# Patient Record
Sex: MALE | Race: WHITE | NOT HISPANIC OR LATINO | Employment: UNEMPLOYED | URBAN - METROPOLITAN AREA
[De-identification: names, ages, dates, MRNs, and addresses within clinical notes are randomized per-mention and may not be internally consistent; named-entity substitution may affect disease eponyms.]

---

## 2019-04-08 ENCOUNTER — TRANSFERRED RECORDS (OUTPATIENT)
Dept: HEALTH INFORMATION MANAGEMENT | Facility: CLINIC | Age: 29
End: 2019-04-08

## 2019-08-14 ENCOUNTER — TRANSFERRED RECORDS (OUTPATIENT)
Dept: HEALTH INFORMATION MANAGEMENT | Facility: CLINIC | Age: 29
End: 2019-08-14

## 2020-03-30 ENCOUNTER — TRANSFERRED RECORDS (OUTPATIENT)
Dept: HEALTH INFORMATION MANAGEMENT | Facility: CLINIC | Age: 30
End: 2020-03-30

## 2020-06-16 ENCOUNTER — TRANSCRIBE ORDERS (OUTPATIENT)
Dept: OTHER | Age: 30
End: 2020-06-16

## 2020-06-16 DIAGNOSIS — M62.838 NECK MUSCLE SPASM: Primary | ICD-10-CM

## 2020-06-17 PROBLEM — M62.838 MUSCLE SPASM: Status: ACTIVE | Noted: 2020-06-17

## 2020-06-17 PROBLEM — G25.9 FUNCTIONAL MOVEMENT DISORDER: Status: ACTIVE | Noted: 2020-06-17

## 2020-06-17 PROBLEM — S06.9X0S: Status: ACTIVE | Noted: 2019-10-09

## 2020-06-17 PROBLEM — G47.411 CATAPLEXY: Status: ACTIVE | Noted: 2020-06-17

## 2020-06-17 RX ORDER — SERTRALINE HYDROCHLORIDE 25 MG/1
25 TABLET, FILM COATED ORAL DAILY
COMMUNITY
Start: 2019-10-09

## 2020-06-17 RX ORDER — PSYLLIUM HUSK 0.4 G
2000 CAPSULE ORAL
COMMUNITY

## 2020-06-17 RX ORDER — ERGOCALCIFEROL 1.25 MG/1
50000 CAPSULE, LIQUID FILLED ORAL WEEKLY
COMMUNITY

## 2020-06-17 RX ORDER — TESTOSTERONE 1.62 MG/G
1 GEL TRANSDERMAL DAILY
COMMUNITY

## 2020-06-17 RX ORDER — CHLORAL HYDRATE 500 MG
1000 CAPSULE ORAL DAILY
COMMUNITY

## 2020-06-17 NOTE — PROGRESS NOTES
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 RRR.   Lungs: CTA  Abdomen: Bowel sounds normal. Soft. Nontender.   Extremities: No edema.   Psych: Normal affect. Smiled during encounter. No suggestion of depression or anxiety.  Neuro: Alert and oriented. Follows commands. Good historian. Speech unremarkable. Cranial nerves grossly intact. Strength intact in ue and le. Reflexes 2+ and symmetric in ue and le. No clonus. Toes downgoing. Sensation intact to temperature and vibration. Finger-nose normal.     Relevant work-up:  --Myasthenia lab panel negative x2 ([facility]), anti-MUSK negative, anaplasmosis antibodies negative, TSH normal x3, paraneoplastic panel negative, CRP and ESR normal, urine heavy metals negative, Sjogren's antibodies negative, ANNABELLE negative, double-stranded DNA negative, SPEP negative, UPEP negative, CPK negative x3.  --2012 Spirometry: Normal spirometry. Significant bronchodilator response observed.   --10/27/14 TTE: Normal echocardiogram.   --10/31/13 MRI brain w/out " "and w/ contrast: Negative MRI of the brain.  --11/25/13 Ultrasound guided left axillary lymph node biopsy: Fragments of benign lymph node.  --11/21/13 CT of the chest w/out contrast (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 2. The lungs are clear and numerous axillary lymph nodes are present which are of uncertain significance.   --11/12/14 CT chest w/out contrast from [facility] (f/u for above study): 1. No significant intrathoracic abnormality identified. Stable residual thymic tissue, a normal finding. 2. Fatty infiltration of the liver, increased in severity compared to the previous exam.  --11/27/13 EMG: This is a normal electrophysiologic study. There was no evidence of neuropathy, myopathy, or neuromuscular junction disease. Repetitive nerve stimulation test on the left median and right nasalis muscle was normal.   --1/3/14 EMG (at [facility]): Routine nerve conduction studies, repetitive stimulation, single fiber analysis, and standard EMG was normal. \"This is a normal study. There is no electrophysiologic evidence of a defect of neuromuscular transmission.\"  --5/27/14 Tilt Table Test at [facility]: \"Tilt was performed to 70 degrees resulting in increase in HR from 65 to 108 with no change in blood pressure. Within 4-5 minutes of tilting, he had multiple episodes including stereotyped movements of rightward gaze and moving his head to the right with persistent muscle contraction. Each episode lasted approximately 5 seconds followed by relaxation to normal. The patient was aware that he had an episode but he was unable to respond to verbal stimuli during the episode. There was no period of confusion, slurred speech, or abnormal muscle movements after the episode was complete. IMPRESSION: Postural orthostatic tachycardia syndrome. Patient will start pyridostigmine 180 mg SA.   --4/1/15 Repeat Tilt Table Test at [facility]: No criteria to confirm the presence of posterior orthostatic tachycardia syndrome (POTS) or a neurocardiogenic response. However, patient's symptoms were reproducible.   --4/6/15 Sleep Study at [facility]: 1. Normal respiratory pattern without evidence of sleep apnea (AHI 2.4/hour, SPO2 luis 90%). 2. Initiation insomnia, otherwise normal sleep architecture.    ASSESSMENT AND PLAN:   25 year old male diagnosed with Postural Orthostatic Tachycardia Syndrome with a title table study at the [facility] in May 2014. He reports substantial symptomatic relief for his stereotyped spells/symptoms with mestinon and florinef, both of which are thought to be useful for POTS. POTS can reportedly be triggered by exertion, standing, and low fluids; it can cause dizziness, lightheadedness, syncope, exercise intolerance, blurred vision, weakness, palpitations, tremulousness, anxiety, nausea, abdominal cramps, early satiety, bloating, constipation, and diarrhea. Thus, his blurred vision, fatigue, weakness, and potentially loss of tone (presyncope and syncope?) as triggered by standing, exertion, and poor fluid intake can be explained by POTS. However, I lack an organic explanation for the stereotyped contractions of his left lower face and left platysma, the left eye adduction and convergence spasms, the stiffening of his extremities, the loss of pain/temperature sensation, the inability to speak, and now the reported inability to breathe for 5-10 seconds, all of which are occurring during his spells. I'm not aware of those particular symptoms being related to POTS. I am the sixth neurologist the patient has been seen by for these same spells/symptoms since their onset in October of 2013: Dr. Mcclellan and Dr. Mc of [facility], Dr. Mckeon of [facility], Dr. Goncalves of the [facility], and [facility] all [facility] previously. Dr. Mcclellan, Dr. Mc, and Dr. Goncalves felt his spells/symptoms were  "functional. Dr. Vargas of the [facility] stated, \"When I evaluated the patient, my impression was that the bulk of his symptoms has no clear organic basis.\" Dr. Mckeon initiated a work-up but didn't see him again per patient (thus no conclusion). At the [facility] the patient saw ophthalmology, neurology, and behavioral health and was labeled as having undifferentiated somatoform disorder, undifferentiated spells, and convergence spasms; their notes from April 2014 indicated conversion disorder was suspected. Like others, I lack an neurological/organic explanation for his presentation and I am not at all confident he even has POTS as [facility] diagnosed [facility] the [facility] (I think the clinical correlation is poor). My lingering concern is that his entire presentation is functional, likely related to a conversion disorder. Plan as follows:    --Continue current dose of pyridostigmine  mg daily for POTS. He thinks it helps.  --Continue fludrocortisone to 0.2 mg daily for POTS. He thinks it helps.  --Agree with recommendations for hydration, salt intake, and compression stockings from cardiology  --Referral to the [facility] for second opinion on the diagnosis of [facility] from the [facility]. I don't feel that is an accurate diagnosis, which I explained to the patient today.  --MRI brain and orbits w/out and with contrast  --Referral to pulmonary medicine to see if they have an organic explanation for his new concerns of breathing disturbance during his spells  --Follow-up with sleep medicine as previously arranged for his reports of hypersomnia  --Follow-up to be determined. I strongly suspect his entire presentation is functional. He has followed here because he was labeled as having P[facility] the [facility], a diagnosis I feel is inaccurate. I am referring him for a second opinion on that at the [facility], with follow-up to be arranged afterwards.     Braulio Adame MD  Neurology Department    Electronically signed by Braulio Adame " MD KHAI at 2016 7:51 AM CDT        Kaylee Mcclellan MD - 2014 3:04 PM [facility]    Patient Name: GUILHERME DOMÍNGUEZ  Date of Service: 2014 : 1990 Age: 23Y Sex: M  DC MRN: [id number] Site MRN:   Patient Loc/Room #: ND THERON/  Provider: Kaylee Mcclellan MD, Neurology    OFFICE NOTE    SITE: [facility]    REASON FOR VISIT: Patient is a 23-year-old man who is in our clinic today for followup regarding neuromuscular symptoms.     HISTORY OF PRESENT ILLNESS: He was last seen in our office in 2013 for symptoms of diplopia, ptosis, and dysphagia. He was referred to [facility]. He was seen at [facility]. 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 They also thought he had fatigue of nonorganic origin. So patient comes in today. He is having new symptoms, including episodes of apnea that happened a few days ago where he stopped breathing for about 10 seconds. He was awake. He said he could not close his eyes, he could not move and he could not breathe for about 10 seconds. Patient has been off prednisone for about a month and his symptoms of ptosis and diplopia have worsened since that time. He reports fatigue that has been worse, shortness of breath. Patient comes in today requesting a referral to an immunologist.     MEDICATIONS: Currently, patient is on:  - Mestinon 180 mg 2 times a day.  - Albuterol.     REVIEW OF SYSTEMS: Please see form in Epic.     PHYSICAL EXAM: Vital signs - blood pressure 119/78, pulse 84, respirations 16. On general exam, patient seems in some distress and had difficulty breathing at times. This would fluctuate during the exam. Sometimes he is breathing fine; sometimes he seems like in labored breathing. He has weird eye movements with some fluctuating ptosis. He is able to move all extremities equally. His gait has needed assist to walk.     IMPRESSION AND PLAN: This is a 23-year-old male with multiple complaints, including  ptosis, diplopia, shortness of breath, fatigue. He was evaluated at [facility] and he was thought to have convergent spasm and fatigue of nonorganic origin. At this point, I am thinking there is some underlying anxiety or psychogenic cause for his symptoms. I told the patient that I would recommend that he see a psychiatrist or a psychologist to help him, at least with other psych symptoms that could be happening at the same time. Patient verbalized that [facility]. For his request to see an immunologist, I told him to call [facility] back and tell them that he is having more symptoms to see if they will recommend that he sees an immunologist there. Patient will follow up in our clinic as needed.     Kaylee Mcclellan MD  [facility]  Neurology    cc:    D: 02/18/2014 17:09:08/[facility] Job ID: [id number]  T: 02/19/2014 15:04:13/jll Document ID: [id number]        Electronically signed by Kaylee Mcclellan MD at 02/21/2014 8:45 PM CST    Back to top of Progress Notes  Kaylee Mcclellan MD - 02/18/2014 5:09 PM CST  This note has been dictated.      Electronically signed by Kaylee Mcclellan MD at 02/18/2014 5:09 PM CST          Alon Garcia MD - 11/29/2013 7:22 PM CST  Formatting of this note might be different from the original.  11/29/2013 HOSPITALIST DISCHARGE SUMMARY Alon Garcia MD    Patient Name: Cruz Bryson  YOB: 1990 Age: 23 year old  Medical Record Number: [id number]  Primary Physician: Zeenat Zafar MD Phone: [phone]  Admission Date: 11/24/2013   Discharge Date: 11/29/2013    He will be discharged from [facility]  to home.    Admission Diagnoses: Neuromuscular disorder [358.9]  Weakness [780.79]  Myasthenia [358.00]    Discharge Diagnoses:   Active Problems:  Neuromuscular disease  Myasthenia    Procedures:   Ultrasound Guided Needle Biopsy of Left Axillary Lymph Node 11/25    Electromyelogram  "(11/27):  \"FINDINGS: The left peroneal, tibial, median, and ulnar motor responses including F-wave responses were normal.   The left sural median and ulnar sensory responses were normal.   Needle EMG of selected muscles of the left upper and lower extremity was normal.   Repetitive nerve stimulation test on the left median and right nasalis muscle was normal.   IMPRESSION: This is a normal electrophysiologic study. There was no evidence of neuropathy, myopathy, or neuromuscular junction disease. \"    Complications: none    Imaging/Studies:  CT-Chest w/o contrast (11/21 at [facility])   \"There is a soft tissue attenuation mass in the anterior mediastinum with a somewhat quadrilateral configuration. This measures 2.3 cm in width. The anterior-posterior diameter is 2.0 cm and it has some mild heterogeneity, possibly some linear fat attenuation.   No additional lymphadenopathy within the mediastinum or aries.   There [are] numerous axillary lymph nodes present bilaterally which have the appearance of benign appearing fatty aries.   The largest axillary lymph node on the left is 2.0 cm in the long axis.   No masses or adenopathy in the upper abdomen.   The lung parenchyma appears normal. No consolidation or pulmonary nodularity. No pleural effusions. Normal heart size with unremarkable appearance of the pericardium.   Impression:   1. 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 The patient has not apparently had surgery as there are no median sternotomy wires identified.   2. The lungs are clear and numerous axillary lymph nodes are present which are of uncertain significance.\"    Discharge Medications:  Current Discharge Medication List     START taking these medications   Details   pyridostigmine CR (MESTINON TIMESPAN) 180 MG tablet Take 1 Tab by mouth two times a day. Do not crush; separate doses by at least 6 hours.  Qty: 60 Tab, Refills: 1     !! predniSONE " (DELTASONE) 20 MG tablet Take one tab by mouth daily for 15 days. Then take 2 tabs by mouth daily. Take with food.  Qty: 45 Tab, Refills: 2     !! predniSONE (DELTASONE) 5 MG tablet Take 1 tab by mouth daily for 5 days. Then take 2 tabs daily for 5 days. Then take 3 tabs daily for 5 days. Take with food.  Qty: 30 Tab, Refills: 0     !! - Potential duplicate medications found. Please discuss with provider.     CONTINUE these medications which have NOT CHANGED   Details   albuterol HFA (PROVENTIL HFA) 108 (90 BASE) MCG/ACT inhaler Inhale 2 Puffs into the lungs every six hours as needed for Shortness of Breath. Shake before using.  Qty: 1 Inhaler       STOP taking these medications     pyridostigmine (MESTINON) 60 MG TABS Comments:   Reason for Stopping:         Follow Up Instructions:  Appointment with Dr. Mcclellan on 12/20/13  Follow up with PCP Dr. Zafar in 3 weeks.    Discharge Procedure Orders  NEW Diet/NPO Status   Order Specific Question Answer Comments   Diet type General     Activity as tolerated     Discharge call MD   Order Comments: Call MD for: temperature > 100.4     Discharge call MD   Order Comments: Call MD for: Persistent nausea and vomiting.     Discharge call MD   Order Comments: Call MD for: severe uncontrolled pain.     Discharge call MD   Order Comments: Call MD for: Difficulty breathing, headache or visual disturbances.     Discharge call MD   Order Comments: Call MD for: Persistent dizziness or lightheadedness.     Discharge call MD   Order Comments: Call MD for: extreme fatigue     Brief Hospital Course: Please see Dr. Jose's H&P from 11/24/13 for complete history. Briefly, Cruz Bryson is a previously healthy 23 year old man with one month history of progressively worsening neuromuscular symptoms. These began with blurred vision and diplopia, and progressed to weakness with ambulation and even weakness of his respiratory muscles. Symptoms always worsened as the day went on and were  improved by morning. He had been previously had a normal brain MRI through his PCP, and had been seen by [facility]. Mckeon at [facility] for outpatient neurologic evaluation. Initial serologic evaluation for myasthenia gravis was negative. He had been started on pyridostigmine 30mg every 4 hours with some improvement of symptoms. A CT-Chest had showed some axillary lymphadenopathy and possible enlargement of the thymus.     He came to the emergency room after his respiratory weakness became severe enough that his girlfriend was afraid he would suffocate in his sleep. In the ER he was noted to have marked dysconjugate external ocular movements, and initially normal strength that became weaker when asked to maintain position against resistance. The remainder of his physical and neurological exam was normal. A CBC and BMP were normal except for Creatinine of 1.21. He was admitted for observation and further workup. Dr. De La Fuente with neurology was consulted. His symptoms improved with increase of his pyridostigmine dose to 60mg; he initially had some side-effects of bradycardia and fasciculations, but these resolved after a few days. Additional serological studies sent to [place]. Biopsy of one of the axillary lymph nodes showed no abnormalities. An EMG also was unrevealing. The patient was started on IV steroids and received 4 doses of IVIG for bridging, for a total of 140g IVIG. By hospital day #6 his symptoms were greatly improved with only minor blurred vision whenever his pyridostigmine began to wear off, and no further respiratory symptoms. He felt well and was ready for discharge. Further workup was planned as an outpatient with Dr. Mcclellan with send-out serologies still pending.     Discharge Exam:  /70  Pulse 63  Temp(Src) 37  C (98.6  F) (Oral)  Resp 18  Ht 1.829 m (6')  Wt 99.519 kg (219 lb 6.4 oz)  BMI 29.75 kg/m2  SpO2 96%  General: Up in chair, pleasant, in no acute discomfort.   HEENT: EOMI; able to  "maintain upward gaze ~20 seconds, then develops dysconjugate gaze. No nystagmus, no conjunctival pallor or icterus. Normal moist OP mucosa.   Neck: Supple, no meningismus.   CV: RRR, no murmurs or gallops   Resp: Good air movement symmetric B/L with no wheezes and no crackles.   Abd: Soft, nontender/nondistended, no guarding or rebound, with normal bowel sounds.   Ext: Normal peripheral pulses, no edema, brisk cap refill   Skin: No rashes, pallor, or icterus   Neuro: CN II-XII grossly intact with exception of EOM abnormalities described above.     Recent Labs:  No results found for this basename: WBC, HGB, PLTS, INR, in the last 72 hours  Recent Labs   11/28/13  0929   *   K 3.7      BUN 17   CREAT 1.07   GLUC 98   CA 9.2     Left Axillary Lymph Node Path Specimen:  \"MACROSCOPIC  Received is one specimen container, labeled with proper patient identification. Specimen is designated \"left axilla\". The specimen is received fresh, for the cytologic evaluation of a needle core biopsy and consists of eight tan soft needle core biopsies and fragments, ranging from 0.1-0.5 cm in length. The specimen is entirely submitted in a single cassette.  Adequacy: Adequate specimen obtained. Sample submitted for flow cytometry, per Dr. Liban Gamino. [facility]    MICROSCOPIC  Sections show fragments of a benign lymph node displaying no atypical  infiltrates or specific diagnostic abnormalities. [facility]    DIAGNOSIS  Lymph node, left axilla, core biopsies: Fragments of benign lymph node.\"    In Process Labs (336h ago through future)   Start Ordered   11/25/13 1645 Paraneoplastc AB ONE TIME, Routine   11/25/13 1645   11/25/13 1044 Sendout Handling ONE TIME   11/25/13 1044   11/25/13 1030 Musk Antibody ONE TIME, Routine   11/25/13 1022       Alon Garcia MD  Discussed with resident team and attending physician.  Total time spent for discharge on date of discharge: >30 minutes.        Electronically signed by Britta Bryson MD " at 11/30/2013 3:50 AM CST

## 2020-06-17 NOTE — TELEPHONE ENCOUNTER
"  RECORDS RECEIVED FROM: External   Date of Appt: 6/19/20   NOTES (FOR ALL VISITS) STATUS DETAILS   OFFICE NOTE from referring provider Received Dr Sadler @ [facility]:  4/17/19  1/10/19   OFFICE NOTE from other specialist N/A Dr Shawn Vargas @ [facility]:  3/11/19  8/31/18    Dr Gopi Adair @ [facility]:  3/30/20   DISCHARGE SUMMARY from hospital N/A    DISCHARGE REPORT from the ER N/A    OPERATIVE REPORT N/A    MEDICATION LIST Received    IMAGING  (FOR ALL VISITS)     EMG N/A    EEG N/A    MRI (HEAD, NECK, SPINE) Received [facility]:  MRI Brain 9/3/14   LUMBAR PUNCTURE Received [facility]  4/8/19   LEATHA Scan N/A    CT (HEAD, NECK, SPINE) N/A       Action 6/17/20 MV 11.47am   Action Taken Received only consult note and referral from [facility]. Faxed to scanning.    Request faxed to [facility] for additional \"extensive work up\" mentioned in referral. (Includes EEG, MRIs, LP)     Action 6/18/20 MV 7.30am   Action Taken Records received from [facility]. Sent to scanning. Images and imaging reports will be mailed.     NOTE: EMG, EEG tests were done in 2013/2014. This is outside of the 2 year danyel for records collection so they were not requested.     Action 6/18/20 MV 7.36am   Action Taken Lumbar puncture results requested from [facility]     Action 6/18/20 MV 10.25am   Action Taken Lumbar puncture results received via fax. Sent to urgent scanning      Imaging Received  6/19/20 MV 7.03am  [facility]   Image Type (x): Disc: x  PACS:    Exam Date/Name MRI Brain 9/3/14 Comments: imaging received via mail. Images uploaded to PACS                      "

## 2020-06-19 ENCOUNTER — VIRTUAL VISIT (OUTPATIENT)
Dept: NEUROLOGY | Facility: CLINIC | Age: 30
End: 2020-06-19
Attending: STUDENT IN AN ORGANIZED HEALTH CARE EDUCATION/TRAINING PROGRAM
Payer: COMMERCIAL

## 2020-06-19 ENCOUNTER — PRE VISIT (OUTPATIENT)
Dept: NEUROLOGY | Facility: CLINIC | Age: 30
End: 2020-06-19

## 2020-06-19 DIAGNOSIS — M62.838 MUSCLE SPASM: ICD-10-CM

## 2020-06-19 DIAGNOSIS — G25.9 FUNCTIONAL MOVEMENT DISORDER: ICD-10-CM

## 2020-06-19 RX ORDER — PYRIDOSTIGMINE BROMIDE 180 MG/1
180 TABLET, EXTENDED RELEASE ORAL 2 TIMES DAILY
COMMUNITY

## 2020-06-19 RX ORDER — FLUDROCORTISONE ACETATE 0.1 MG/1
0.2 TABLET ORAL DAILY
COMMUNITY

## 2020-06-19 ASSESSMENT — PAIN SCALES - GENERAL: PAINLEVEL: NO PAIN (0)

## 2020-06-19 NOTE — PROGRESS NOTES
"Cruz Bryson is a 29 year old male who is being evaluated via a billable video visit.      The patient has been notified of following:     \"This video visit will be conducted via a call between you and your physician/provider. We have found that certain health care needs can be provided without the need for an in-person physical exam.  This service lets us provide the care you need with a video conversation.  If a prescription is necessary we can send it directly to your pharmacy.  If lab work is needed we can place an order for that and you can then stop by our lab to have the test done at a later time.    Video visits are billed at different rates depending on your insurance coverage.  Please reach out to your insurance provider with any questions.    If during the course of the call the physician/provider feels a video visit is not appropriate, you will not be charged for this service.\"    Patient has given verbal consent for Video visit? YES  PLEASE SEND LINK TO   [email or web address]    Will anyone else be joining your video visit? No        Video-Visit Details    Type of service:  Video Visit    Video Start Time:   Video End Time:     Originating Location (pt. Location): Home    Distant Location (provider location):  [facility] NEUROLOGY     Platform used for Video Visit: Other: power [employer] EMT  "

## 2020-06-19 NOTE — PATIENT INSTRUCTIONS
Discussed diagnosis  [email or web address]    Consider visit at [facility]   Contact: Nimisha Miller MD [phone] [email or web address]   Contact: Kathya Bonilla MD [phone] [email or web address]     Consider visit with Rafael Narvaez at [facility], [address], [place], [place], [address]    Consider visit with Dr Meera Brown at [facility]  [email or web address]    Be[facility] Program in patient rehabilitation at [facility]    Neuromodulatory research  TMS - transcranial magnetic stimulation  tdCS - transcranial direct current stimulation     Physical Therapy with Radha Yates at [place]

## 2020-06-19 NOTE — LETTER
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 that.    ROS: Reviewed. As per epic.     OBJECTIVE:  Blood pressure 129/81, pulse 82, resp. rate (!) 98.  General: Young  male. Cooperative. No distress.   Skin: No rashes/lesions noted in exposed areas.  HEENT: Atraumatic. Normocephalic. PERRL.   Neck: No thyromegaly.  Heart: RRR.   Lungs: CTA  Abdomen: Bowel sounds normal. Soft. Nontender.   Extremities: No edema.   Psych: Normal affect. Smiled during encounter. No suggestion of depression or anxiety.  Neuro: Alert and oriented. Follows commands. Good historian. Speech unremarkable. Cranial nerves grossly intact. Strength intact in ue and le. Reflexes 2+ and symmetric in ue and le. No clonus. Toes downgoing. Sensation intact to temperature and vibration. Finger-nose normal.     Relevant work-up:  --Myasthenia lab panel negative x2 ( and [place]), anti-MUSK negative, anaplasmosis antibodies negative, TSH normal x3, paraneoplastic panel negative, CRP and ESR normal, " "urine heavy metals negative, Sjogren's antibodies negative, ANNABELLE negative, double-stranded DNA negative, SPEP negative, UPEP negative, CPK negative x3.  --2012 Spirometry: Normal spirometry. Significant bronchodilator response observed.   --10/27/14 TTE: Normal echocardiogram.   --10/31/13 MRI brain w/out and w/ contrast: Negative MRI of the brain.  --11/25/13 Ultrasound guided left axillary lymph node biopsy: Fragments of benign lymph node.  --11/21/13 CT of the chest w/out contrast (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 2. The lungs are clear and numerous axillary lymph nodes are present which are of uncertain significance.   --11/12/14 CT chest w/out contrast from [facility] (f/u for above study): 1. No significant intrathoracic abnormality identified. Stable residual thymic tissue, a normal finding. 2. Fatty infiltration of the liver, increased in severity compared to the previous exam.  --11/27/13 EMG: This is a normal electrophysiologic study. There was no evidence of neuropathy, myopathy, or neuromuscular junction disease. Repetitive nerve stimulation test on the left median and right nasalis muscle was normal.   --1/3/14 EMG (at [facility]): Routine nerve conduction studies, repetitive stimulation, single fiber analysis, and standard EMG was normal. \"This is a normal study. There is no electrophysiologic evidence of a defect of neuromuscular transmission.\"  --5/27/14 Tilt Table Test at [facility]: \"Tilt was performed to 70 degrees resulting in increase in HR from 65 to 108 with no change in blood pressure. Within 4-5 minutes of tilting, he had multiple episodes including stereotyped movements of rightward gaze and moving his head to the right with persistent muscle contraction. Each episode lasted approximately 5 seconds followed by relaxation to normal. The patient was aware that he had an episode but he was unable " to respond to verbal stimuli during the episode. There was no period of confusion, slurred speech, or abnormal muscle movements after the episode was complete. IMPRESSION: Postural orthostatic tachycardia syndrome. Patient will start pyridostigmine 180 mg SA.   --4/1/15 Repeat Tilt Table Test at [facility]: No criteria to confirm the presence of posterior orthostatic tachycardia syndrome (POTS) or a neurocardiogenic response. However, patient's symptoms were reproducible.   --4/6/15 Sleep Study at [facility]: 1. Normal respiratory pattern without evidence of sleep apnea (AHI 2.4/hour, SPO2 luis 90%). 2. Initiation insomnia, otherwise normal sleep architecture.    ASSESSMENT AND PLAN:   25 year old male diagnosed with Postural Orthostatic Tachycardia Syndrome with a title table study at the [facility] in May 2014. He reports substantial symptomatic relief for his stereotyped spells/symptoms with mestinon and florinef, both of which are thought to be useful for POTS. POTS can reportedly be triggered by exertion, standing, and low fluids; it can cause dizziness, lightheadedness, syncope, exercise intolerance, blurred vision, weakness, palpitations, tremulousness, anxiety, nausea, abdominal cramps, early satiety, bloating, constipation, and diarrhea. Thus, his blurred vision, fatigue, weakness, and potentially loss of tone (presyncope and syncope?) as triggered by standing, exertion, and poor fluid intake can be explained by POTS. However, I lack an organic explanation for the stereotyped contractions of his left lower face and left platysma, the left eye adduction and convergence spasms, the stiffening of his extremities, the loss of pain/temperature sensation, the inability to speak, and now the reported inability to breathe for 5-10 seconds, all of which are occurring during his spells. I'm not aware of those particular symptoms being related to POTS. I am the sixth neurologist the patient has been  "seen by for these same spells/symptoms since their onset in October of 2013: Dr. Mcclellan and Dr. Mc of [facility], Dr. Mckeon of [facility], Dr. Goncalves of [facility], and [facility] all [facility]. Dr. Mcclellan, Dr. Mc, and Dr. Goncalves felt his spells/symptoms were functional. Dr. Vargas of the [facility] stated, \"When I evaluated the patient, my impression was that the bulk of his symptoms has no clear organic basis.\" Dr. Mckeon initiated a work-up but didn't see him again per patient (thus no conclusion). At the [facility] the patient saw ophthalmology, neurology, and behavioral health and was labeled as having undifferentiated somatoform disorder, undifferentiated spells, and convergence spasms; their notes from April 2014 indicated conversion disorder was suspected. Like others, I lack an neurological/organic explanation for his presentation and I am not at all confident he even has POTS as [facility] diagnosed [facility] the [facility] (I think the clinical correlation is poor). My lingering concern is that his entire presentation is functional, likely related to a conversion disorder. Plan as follows:    --Continue current dose of pyridostigmine  mg daily for POTS. He thinks it helps.  --Continue fludrocortisone to 0.2 mg daily for POTS. He thinks it helps.  --Agree with recommendations for hydration, salt intake, and compression stockings from cardiology  --Referral to the [facility] for second opinion on the diagnosis of [facility]. I don't feel that is an accurate diagnosis, which I explained to the patient today.  --MRI brain and orbits w/out and with contrast  --Referral to pulmonary medicine to see if they have an organic explanation for his new concerns of breathing disturbance during his spells  --Follow-up with sleep medicine as previously arranged for his reports of hypersomnia  --Follow-up to be determined. I strongly suspect his entire presentation is " functional. He has followed here because he was labeled as having [facility], a diagnosis I feel is inaccurate. I am referring him for a second opinion on that at the [facility], with follow-up to be arranged afterwards.     Braulio Adame MD  Neurology Department    Electronically signed by Braulio Adame MD at 2016 7:51 AM CDT        Kaylee Mcclellan MD - 2014 3:04 PM [facility]    Patient Name: GUILHERME DOMÍNGUEZ  Date of Service: 2014 : 1990 Age: 23Y Sex: M  DC MRN: [id number] Site MRN:   Patient Loc/Room #: DC THERON/  Provider: Kaylee Mcclellan MD, Neurology    OFFICE NOTE    SITE: [facility]    REASON FOR VISIT: Patient is a 23-year-old man who is in our clinic today for followup regarding neuromuscular symptoms.     HISTORY OF PRESENT ILLNESS: He was last seen in our office in 2013 for symptoms of diplopia, ptosis, and dysphagia. He was referred to [place]. He was seen at [place]. 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 They also thought he had fatigue of nonorganic origin. So patient comes in today. He is having new symptoms, including episodes of apnea that happened a few days ago where he stopped breathing for about 10 seconds. He was awake. He said he could not close his eyes, he could not move and he could not breathe for about 10 seconds. Patient has been off prednisone for about a month and his symptoms of ptosis and diplopia have worsened since that time. He reports fatigue that has been worse, shortness of breath. Patient comes in today requesting a referral to an immunologist.     MEDICATIONS: Currently, patient is on:  - Mestinon 180 mg 2 times a day.  - Albuterol.     REVIEW OF SYSTEMS: Please see form in Epic.     PHYSICAL EXAM: Vital signs - blood pressure 119/78, pulse 84, respirations 16. On general exam, patient seems in some distress and had difficulty breathing at times. This would fluctuate during the  exam. Sometimes he is breathing fine; sometimes he seems like in labored breathing. He has weird eye movements with some fluctuating ptosis. He is able to move all extremities equally. His gait has needed assist to walk.     IMPRESSION AND PLAN: This is a 23-year-old male with multiple complaints, including ptosis, diplopia, shortness of breath, fatigue. 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 At this point, I am thinking there is some underlying anxiety or psychogenic cause for his symptoms. I told the patient that I would recommend that he see a psychiatrist or a psychologist to help him, at least with other psych symptoms that could be happening at the same time. Patient verbalized that [facility]. For his request to see an immunologist, I told him to call [place] back and tell them that he is having more symptoms to see if they will recommend that he sees an immunologist there. Patient will follow up in our clinic as needed.     Kaylee Mcclellan MD  [facility]  Neurology    cc:    D: 02/18/2014 17:09:08/[facility] Job ID: [id number]  T: 02/19/2014 15:04:13/jll Document ID: [id number]        Electronically signed by Kaylee Mcclellan MD at 02/21/2014 8:45 PM CST    Back to top of Progress Notes  Kaylee Mcclellan MD - 02/18/2014 5:09 PM CST  This note has been dictated.      Electronically signed by Kaylee Mcclellan MD at 02/18/2014 5:09 PM CST          Alon Garcia MD - 11/29/2013 7:22 PM CST  Formatting of this note might be different from the original.  11/29/2013 HOSPITALIST DISCHARGE SUMMARY Alon Garcia MD    Patient Name: Cruz Bryson  YOB: 1990 Age: 23 year old  Medical Record Number: [id number]  Primary Physician: Zeenat Zafar MD Phone: [phone]  Admission Date: 11/24/2013   Discharge Date: 11/29/2013    He will be discharged from [facility]  to home.    Admission Diagnoses: Neuromuscular disorder [358.9]  Weakness [780.79]  Myasthenia [358.00]    Discharge Diagnoses:   Active Problems:  Neuromuscular disease  Myasthenia    Procedures:   Ultrasound Guided Needle Biopsy of Left Axillary Lymph Node 11/25    Electromyelogram (11/27):  \"FINDINGS: The left peroneal, tibial, median, and ulnar motor responses including F-wave responses were normal.   The left sural median and ulnar sensory responses were normal.   Needle EMG of selected muscles of the left upper and lower extremity was normal.   Repetitive nerve stimulation test on the left median and right nasalis muscle was normal.   IMPRESSION: This is a normal electrophysiologic study. There was no evidence of neuropathy, myopathy, or neuromuscular junction disease. \"    Complications: none    Imaging/Studies:  CT-Chest w/o contrast (11/21 at [facility])   \"There is a soft tissue attenuation mass in the anterior mediastinum with a somewhat quadrilateral configuration. This measures 2.3 cm in width. The anterior-posterior diameter is 2.0 cm and it has some mild heterogeneity, possibly some linear fat attenuation.   No additional lymphadenopathy within the mediastinum or aries.   There [are] numerous axillary lymph nodes present bilaterally which have the appearance of benign appearing fatty aries.   The largest axillary lymph node on the left is 2.0 cm in the long axis.   No masses or adenopathy in the upper abdomen.   The lung parenchyma appears normal. No consolidation or pulmonary nodularity. No pleural effusions. Normal heart size with unremarkable appearance of the pericardium.   Impression:   1. 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 The patient has not apparently had surgery as there are no median sternotomy wires identified.   2. The lungs are clear and numerous axillary lymph nodes are present which are of uncertain " "significance.\"    Discharge Medications:  Current Discharge Medication List     START taking these medications   Details   pyridostigmine CR (MESTINON TIMESPAN) 180 MG tablet Take 1 Tab by mouth two times a day. Do not crush; separate doses by at least 6 hours.  Qty: 60 Tab, Refills: 1     !! predniSONE (DELTASONE) 20 MG tablet Take one tab by mouth daily for 15 days. Then take 2 tabs by mouth daily. Take with food.  Qty: 45 Tab, Refills: 2     !! predniSONE (DELTASONE) 5 MG tablet Take 1 tab by mouth daily for 5 days. Then take 2 tabs daily for 5 days. Then take 3 tabs daily for 5 days. Take with food.  Qty: 30 Tab, Refills: 0     !! - Potential duplicate medications found. Please discuss with provider.     CONTINUE these medications which have NOT CHANGED   Details   albuterol HFA (PROVENTIL HFA) 108 (90 BASE) MCG/ACT inhaler Inhale 2 Puffs into the lungs every six hours as needed for Shortness of Breath. Shake before using.  Qty: 1 Inhaler       STOP taking these medications     pyridostigmine (MESTINON) 60 MG TABS Comments:   Reason for Stopping:         Follow Up Instructions:  Appointment with Dr. Mcclellan on 12/20/13  Follow up with PCP Dr. Zafar in 3 weeks.    Discharge Procedure Orders  NEW Diet/NPO Status   Order Specific Question Answer Comments   Diet type General     Activity as tolerated     Discharge call MD   Order Comments: Call MD for: temperature > 100.4     Discharge call MD   Order Comments: Call MD for: Persistent nausea and vomiting.     Discharge call MD   Order Comments: Call MD for: severe uncontrolled pain.     Discharge call MD   Order Comments: Call MD for: Difficulty breathing, headache or visual disturbances.     Discharge call MD   Order Comments: Call MD for: Persistent dizziness or lightheadedness.     Discharge call MD   Order Comments: Call MD for: extreme fatigue     Brief Hospital Course: Please see Dr. Jose's H&P from 11/24/13 for complete history. Briefly, Cruz Bryson " is a previously healthy 23 year old man with one month history of progressively worsening neuromuscular symptoms. These began with blurred vision and diplopia, and progressed to weakness with ambulation and even weakness of his respiratory muscles. Symptoms always worsened as the day went on and were improved by morning. He had been previously had a normal brain MRI through his PCP, and had been seen by Dr. Mckeon at [facility] for outpatient neurologic evaluation. Initial serologic evaluation for myasthenia gravis was negative. He had been started on pyridostigmine 30mg every 4 hours with some improvement of symptoms. A CT-Chest had showed some axillary lymphadenopathy and possible enlargement of the thymus.     He came to the emergency room after his respiratory weakness became severe enough that his girlfriend was afraid he would suffocate in his sleep. In the ER he was noted to have marked dysconjugate external ocular movements, and initially normal strength that became weaker when asked to maintain position against resistance. The remainder of his physical and neurological exam was normal. A CBC and BMP were normal except for Creatinine of 1.21. He was admitted for observation and further workup. Dr. De La Fuente with neurology was consulted. His symptoms improved with increase of his pyridostigmine dose to 60mg; he initially had some side-effects of bradycardia and fasciculations, but these resolved after a few days. Additional serological studies sent to [place]. Biopsy of one of the axillary lymph nodes showed no abnormalities. An EMG also was unrevealing. The patient was started on IV steroids and received 4 doses of IVIG for bridging, for a total of 140g IVIG. By hospital day #6 his symptoms were greatly improved with only minor blurred vision whenever his pyridostigmine began to wear off, and no further respiratory symptoms. He felt well and was ready for discharge. Further workup was planned as an outpatient with  "Dr. Mcclellan with send-out serologies still pending.     Discharge Exam:  /70  Pulse 63  Temp(Src) 37  C (98.6  F) (Oral)  Resp 18  Ht 1.829 m (6')  Wt 99.519 kg (219 lb 6.4 oz)  BMI 29.75 kg/m2  SpO2 96%  General: Up in chair, pleasant, in no acute discomfort.   HEENT: EOMI; able to maintain upward gaze ~20 seconds, then develops dysconjugate gaze. No nystagmus, no conjunctival pallor or icterus. Normal moist OP mucosa.   Neck: Supple, no meningismus.   CV: RRR, no murmurs or gallops   Resp: Good air movement symmetric B/L with no wheezes and no crackles.   Abd: Soft, nontender/nondistended, no guarding or rebound, with normal bowel sounds.   Ext: Normal peripheral pulses, no edema, brisk cap refill   Skin: No rashes, pallor, or icterus   Neuro: CN II-XII grossly intact with exception of EOM abnormalities described above.     Recent Labs:  No results found for this basename: WBC, HGB, PLTS, INR, in the last 72 hours  Recent Labs   11/28/13  0929   *   K 3.7      BUN 17   CREAT 1.07   GLUC 98   CA 9.2     Left Axillary Lymph Node Path Specimen:  \"MACROSCOPIC  Received is one specimen container, labeled with proper patient identification. Specimen is designated \"left axilla\". The specimen is received fresh, for the cytologic evaluation of a needle core biopsy and consists of eight tan soft needle core biopsies and fragments, ranging from 0.1-0.5 cm in length. The specimen is entirely submitted in a single cassette.  Adequacy: Adequate specimen obtained. Sample submitted for flow cytometry, per Dr. Liban Gamino. [facility]    MICROSCOPIC  Sections show fragments of a benign lymph node displaying no atypical  infiltrates or specific diagnostic abnormalities. [facility]    DIAGNOSIS  Lymph node, left axilla, core biopsies: Fragments of benign lymph node.\"    In Process Labs (336h ago through future)   Start Ordered   11/25/13 1645 Paraneoplastc AB ONE TIME, Routine   11/25/13 1645   11/25/13 1044 Sendout " "Handling ONE TIME   11/25/13 1044   11/25/13 1030 Musk Antibody ONE TIME, Routine   11/25/13 1022       Alon Garcia MD  Discussed with resident team and attending physician.  Total time spent for discharge on date of discharge: >30 minutes.        Electronically signed by Britta Bryson MD at 11/30/2013 3:50 AM CST        Cruz Bryson is a 29 year old male who is being evaluated via a billable video visit.      The patient has been notified of following:     \"This video visit will be conducted via a call between you and your physician/provider. We have found that certain health care needs can be provided without the need for an in-person physical exam.  This service lets us provide the care you need with a video conversation.  If a prescription is necessary we can send it directly to your pharmacy.  If lab work is needed we can place an order for that and you can then stop by our lab to have the test done at a later time.    Video visits are billed at different rates depending on your insurance coverage.  Please reach out to your insurance provider with any questions.    If during the course of the call the physician/provider feels a video visit is not appropriate, you will not be charged for this service.\"    Patient has given verbal consent for Video visit? YES  PLEASE SEND LINK TO   [email or web address]    Will anyone else be joining your video visit? No        Video-Visit Details    Type of service:  Video Visit    Video Start Time:   Video End Time:     Originating Location (pt. Location): Home    Distant Location (provider location):  [facility] NEUROLOGY     Platform used for Video Visit: Other: maria inesd    SARAI Alex      Again, thank you for allowing me to participate in the care of your patient.      Sincerely,    Nicola Burgos MD      "

## 2021-01-04 ENCOUNTER — HEALTH MAINTENANCE LETTER (OUTPATIENT)
Age: 31
End: 2021-01-04

## 2021-10-10 ENCOUNTER — HEALTH MAINTENANCE LETTER (OUTPATIENT)
Age: 31
End: 2021-10-10

## 2021-12-07 ENCOUNTER — TRANSFERRED RECORDS (OUTPATIENT)
Dept: HEALTH INFORMATION MANAGEMENT | Facility: CLINIC | Age: 31
End: 2021-12-07

## 2021-12-07 ENCOUNTER — MEDICAL CORRESPONDENCE (OUTPATIENT)
Dept: HEALTH INFORMATION MANAGEMENT | Facility: CLINIC | Age: 31
End: 2021-12-07

## 2021-12-09 ENCOUNTER — TELEPHONE (OUTPATIENT)
Dept: NEUROLOGY | Facility: CLINIC | Age: 31
End: 2021-12-09
Payer: COMMERCIAL

## 2021-12-09 NOTE — TELEPHONE ENCOUNTER
Received referral from Dr. Carlee Lucas at [facility].    Referred for drop attacks.    [facility]  for further evaluation with long-ter video EEG.    Call patient to schedule.

## 2022-01-30 ENCOUNTER — HEALTH MAINTENANCE LETTER (OUTPATIENT)
Age: 32
End: 2022-01-30

## 2022-03-03 ENCOUNTER — HOSPITAL ENCOUNTER (OUTPATIENT)
Facility: CLINIC | Age: 32
End: 2022-03-03
Attending: PSYCHIATRY & NEUROLOGY | Admitting: PSYCHIATRY & NEUROLOGY

## 2022-03-03 NOTE — TELEPHONE ENCOUNTER
Appointments scheduled.  [facility] sending authorization by fax to my attention.

## 2022-09-24 ENCOUNTER — HEALTH MAINTENANCE LETTER (OUTPATIENT)
Age: 32
End: 2022-09-24

## 2023-05-08 ENCOUNTER — HEALTH MAINTENANCE LETTER (OUTPATIENT)
Age: 33
End: 2023-05-08